# Patient Record
Sex: FEMALE | Race: BLACK OR AFRICAN AMERICAN | Employment: UNEMPLOYED | URBAN - METROPOLITAN AREA
[De-identification: names, ages, dates, MRNs, and addresses within clinical notes are randomized per-mention and may not be internally consistent; named-entity substitution may affect disease eponyms.]

---
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
## 2024-01-24 NOTE — PROGRESS NOTES
Patient: Kamini Pak                MRN: [id number]       SSN: [id number]  YOB: 2005        AGE: 18 y.o.        SEX: female      PCP: Houston Valdez MD  01/26/24    Chief Complaint   Patient presents with    Hand Pain     Left thumb     HISTORY:  Kamini Pak is a 18 y.o. female who is seen for increased left thumb pain related to her injury in November. She heard and felt her left thumb crack while attempting to do a handstand. She was seen at [facility] ED on 12/4/23 where xrays revealed no acute injury. She was provided with a thumb spica splint. Her thumb has been aching and stiff since her injury.  She is doing better but she still experiences pain when she bends her thumb.  She notes she is double jointed and her thumb now has greatly diminished flexibility. She is right-handed. She experiences pain at night.     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 She is originally from [place]. She currently lives in the freshmen dorms at [employer]. 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    Wt Readings from Last 3 Encounters:   01/26/24 63 kg (139 lb) (70 %, Z= 0.52)*   12/11/23 65.8 kg (145 lb) (77 %, Z= 0.75)*   12/04/23 60.7 kg (133 lb 14.4 oz) (63 %, Z= 0.34)*     * Growth percentiles are based on [facility] (Girls, 2-20 Years) data.      Body mass index is 25.42 kg/m².    Patient Active Problem List   Diagnosis    No known health problems    S/P bilateral breast reduction       Social History     Tobacco Use    Smoking status: Never     Passive exposure: Yes    Smokeless tobacco: Never   Substance Use Topics    Alcohol use: Never    Drug use: Never        Allergies   Allergen Reactions    Copper-Containing Compounds Rash        Current Outpatient Medications   Medication Sig    ondansetron (ZOFRAN) 4 MG tablet Take 1 tablet by mouth every 8 hours as needed for Nausea or Vomiting (Patient not taking: Reported on 12/4/2023)

## 2024-01-26 ENCOUNTER — OFFICE VISIT (OUTPATIENT)
Age: 19
End: 2024-01-26
Payer: MEDICAID

## 2024-01-26 VITALS — WEIGHT: 139 LBS | TEMPERATURE: 97.3 F | BODY MASS INDEX: 25.58 KG/M2 | HEIGHT: 62 IN

## 2024-01-26 DIAGNOSIS — G89.29 CHRONIC THUMB PAIN, LEFT: ICD-10-CM

## 2024-01-26 DIAGNOSIS — S63.642D GAMEKEEPER'S THUMB OF LEFT HAND, SUBSEQUENT ENCOUNTER: Primary | ICD-10-CM

## 2024-01-26 DIAGNOSIS — M79.645 CHRONIC THUMB PAIN, LEFT: ICD-10-CM

## 2024-01-26 PROCEDURE — 99213 OFFICE O/P EST LOW 20 MIN: CPT | Performed by: SPECIALIST

## 2024-02-23 ENCOUNTER — OFFICE VISIT (OUTPATIENT)
Age: 19
End: 2024-02-23
Payer: MEDICAID

## 2024-02-23 VITALS — WEIGHT: 140 LBS | BODY MASS INDEX: 24.8 KG/M2 | HEIGHT: 63 IN

## 2024-02-23 DIAGNOSIS — S63.642A SPRAIN OF METACARPOPHALANGEAL (MCP) JOINT OF LEFT THUMB, INITIAL ENCOUNTER: Primary | ICD-10-CM

## 2024-02-23 PROCEDURE — 99213 OFFICE O/P EST LOW 20 MIN: CPT | Performed by: ORTHOPAEDIC SURGERY

## 2024-02-23 NOTE — PROGRESS NOTES
Kamini Pak is a 18 y.o. female right handed.  Worker's Compensation and legal considerations: none    Chief Complaint   Patient presents with    Thumb pain     Left     Pain Score:   8    HPI: Patient presents today with complaints of left thumb pain since a fall in November 2023.    Date of onset: November 2023  Injury: Yes: Comment: Fall  Prior Treatment:  No    ROS: Review of Systems - General ROS: negative except HPI    Past Medical History:   Diagnosis Date    No known health problems 4/25/2019       Past Surgical History:   Procedure Laterality Date    ADENOIDECTOMY  2014    BREAST REDUCTION SURGERY      07/2021    TONSILLECTOMY  2014        Current Outpatient Medications   Medication Sig Dispense Refill    ibuprofen (ADVIL;MOTRIN) 600 MG tablet Take 1 tablet by mouth every 6 hours as needed      ondansetron (ZOFRAN) 4 MG tablet Take 1 tablet by mouth every 8 hours as needed for Nausea or Vomiting (Patient not taking: Reported on 12/4/2023) 15 tablet 0    dicyclomine (BENTYL) 10 MG capsule Take 1 capsule by mouth 4 times daily for 5 days 20 capsule 0     No current facility-administered medications for this visit.       Allergies   Allergen Reactions    Copper-Containing Compounds Rash         Ht 1.6 m (5' 3\")   Wt 63.5 kg (140 lb)   LMP 01/22/2024 (Approximate)   BMI 24.80 kg/m²   Physical Exam  Vitals and nursing note reviewed.   Constitutional:       General: She is not in acute distress.     Appearance: Normal appearance. She is not ill-appearing.   Cardiovascular:      Pulses: Normal pulses.   Pulmonary:      Effort: Pulmonary effort is normal. No respiratory distress.   Musculoskeletal:         General: Tenderness present. No swelling, deformity or signs of injury. Normal range of motion.      Cervical back: Normal range of motion and neck supple.      Right lower leg: No edema.      Left lower leg: No edema.   Skin:     General: Skin is warm and dry.      Capillary Refill: Capillary refill takes

## 2024-03-06 ENCOUNTER — HOSPITAL ENCOUNTER (EMERGENCY)
Facility: HOSPITAL | Age: 19
Discharge: HOME | End: 2024-03-06
Payer: MEDICAID

## 2024-03-06 VITALS
WEIGHT: 140 LBS | DIASTOLIC BLOOD PRESSURE: 55 MMHG | RESPIRATION RATE: 18 BRPM | HEIGHT: 62 IN | HEART RATE: 98 BPM | BODY MASS INDEX: 25.76 KG/M2 | SYSTOLIC BLOOD PRESSURE: 107 MMHG | OXYGEN SATURATION: 98 % | TEMPERATURE: 97.5 F

## 2024-03-06 DIAGNOSIS — B37.31 VAGINAL YEAST INFECTION: Primary | ICD-10-CM

## 2024-03-06 LAB
APPEARANCE UR: CLEAR
BACTERIA URNS QL MICRO: NEGATIVE /HPF
BILIRUB UR QL: NEGATIVE
C TRACH DNA SPEC QL NAA+PROBE: NEGATIVE
CLUE CELLS VAG QL WET PREP: NORMAL
COLOR UR: ABNORMAL
EPITH CASTS URNS QL MICRO: ABNORMAL /LPF
GLUCOSE UR STRIP.AUTO-MCNC: NEGATIVE MG/DL
HCG UR QL: NEGATIVE
HGB UR QL STRIP: NEGATIVE
KETONES UR QL STRIP.AUTO: NEGATIVE MG/DL
KOH PREP SPEC: NORMAL
LEUKOCYTE ESTERASE UR QL STRIP.AUTO: ABNORMAL
N GONORRHOEA DNA SPEC QL NAA+PROBE: NEGATIVE
NITRITE UR QL STRIP.AUTO: NEGATIVE
PH UR STRIP: 7 (ref 5–8)
PROT UR STRIP-MCNC: NEGATIVE MG/DL
RBC #/AREA URNS HPF: ABNORMAL /HPF (ref 0–5)
SAMPLE TYPE: NORMAL
SERVICE CMNT-IMP: NORMAL
SERVICE CMNT-IMP: NORMAL
SP GR UR REFRACTOMETRY: 1.01
SPECIMEN SOURCE: NORMAL
T VAGINALIS VAG QL WET PREP: NORMAL
URINE CULTURE IF INDICATED: ABNORMAL
UROBILINOGEN UR QL STRIP.AUTO: 0.2 EU/DL (ref 0.2–1)
WBC URNS QL MICRO: ABNORMAL /HPF (ref 0–4)

## 2024-03-06 PROCEDURE — 87491 CHLMYD TRACH DNA AMP PROBE: CPT

## 2024-03-06 PROCEDURE — 87210 SMEAR WET MOUNT SALINE/INK: CPT

## 2024-03-06 PROCEDURE — 81001 URINALYSIS AUTO W/SCOPE: CPT

## 2024-03-06 PROCEDURE — 81025 URINE PREGNANCY TEST: CPT

## 2024-03-06 PROCEDURE — 99283 EMERGENCY DEPT VISIT LOW MDM: CPT

## 2024-03-06 PROCEDURE — 6370000000 HC RX 637 (ALT 250 FOR IP): Performed by: PHYSICIAN ASSISTANT

## 2024-03-06 PROCEDURE — 87591 N.GONORRHOEAE DNA AMP PROB: CPT

## 2024-03-06 RX ORDER — NYSTATIN AND TRIAMCINOLONE ACETONIDE 100000; 1 [USP'U]/G; MG/G
OINTMENT TOPICAL
Qty: 15 G | Refills: 0 | Status: SHIPPED | OUTPATIENT
Start: 2024-03-06

## 2024-03-06 RX ORDER — FLUCONAZOLE 150 MG/1
150 TABLET ORAL ONCE
Qty: 1 TABLET | Refills: 0 | Status: SHIPPED | OUTPATIENT
Start: 2024-03-09 | End: 2024-03-09

## 2024-03-06 RX ORDER — FLUCONAZOLE 150 MG/1
150 TABLET ORAL
Status: COMPLETED | OUTPATIENT
Start: 2024-03-06 | End: 2024-03-06

## 2024-03-06 RX ADMIN — FLUCONAZOLE 150 MG: 150 TABLET ORAL at 16:55

## 2024-03-06 ASSESSMENT — PAIN DESCRIPTION - ORIENTATION: ORIENTATION: OTHER (COMMENT)

## 2024-03-06 ASSESSMENT — PAIN DESCRIPTION - LOCATION: LOCATION: VAGINA

## 2024-03-06 ASSESSMENT — PAIN SCALES - GENERAL: PAINLEVEL_OUTOF10: 5

## 2024-03-06 ASSESSMENT — PAIN - FUNCTIONAL ASSESSMENT: PAIN_FUNCTIONAL_ASSESSMENT: 0-10

## 2024-03-06 ASSESSMENT — PAIN DESCRIPTION - PAIN TYPE: TYPE: ACUTE PAIN

## 2024-03-06 ASSESSMENT — PAIN DESCRIPTION - DESCRIPTORS: DESCRIPTORS: SHARP

## 2024-03-06 NOTE — ED NOTES

## 2024-03-06 NOTE — ED NOTES
Pt presents ambulatory to ED complaining of white, thick vaginal discharge, vaginal itching, swelling, and redness for the past 2-3 days. Pt reports she just finished antibiotics for BV. Pt also reports that partner was treated for chlamydia recently. Pt is alert and oriented x 4, RR even and unlabored, skin is warm and dry. Assesment completed and pt updated on plan of care.       Emergency Department Nursing Plan of Care       The Nursing Plan of Care is developed from the Nursing assessment and Emergency Department Attending provider initial evaluation.  The plan of care may be reviewed in the “ED Provider note”.      Signed     MADY PATEL RN    3/6/2024   5:13 PM

## 2024-03-06 NOTE — ED PROVIDER NOTES
medications      dicyclomine 10 MG capsule  Commonly known as: BENTYL  Take 1 capsule by mouth 4 times daily for 5 days     ibuprofen 600 MG tablet  Commonly known as: ADVIL;MOTRIN     ondansetron 4 MG tablet  Commonly known as: ZOFRAN  Take 1 tablet by mouth every 8 hours as needed for Nausea or Vomiting               Where to Get Your Medications        These medications were sent to [facility]/pharmacy #[id number] - [place], [place] - [address] - P [phone] - F [phone]  [address] [facility] [address]      Hours: 24-hours Phone: [phone]   fluconazole 150 MG tablet  nystatin-triamcinolone 762419-5.1 UNIT/GM-% ointment           DISCONTINUED MEDICATIONS:  Current Discharge Medication List          I have seen and evaluated the patient autonomously. My supervision physician was on site and available for consultation if needed.     I am the Primary Clinician of Record.   Zaira Sanchez PA-C (electronically signed)    (Please note that parts of this dictation were completed with voice recognition software. Quite often unanticipated grammatical, syntax, homophones, and other interpretive errors are inadvertently transcribed by the computer software. Please disregards these errors. Please excuse any errors that have escaped final proofreading.)     Zaira Sanchez PA-C  03/06/24 [id number]

## 2024-03-07 RX ORDER — NYSTATIN 100000 U/G
CREAM TOPICAL
Qty: 15 G | Refills: 0 | Status: SHIPPED | OUTPATIENT
Start: 2024-03-07

## 2024-03-14 ENCOUNTER — HOSPITAL ENCOUNTER (OUTPATIENT)
Facility: HOSPITAL | Age: 19
Discharge: HOME | End: 2024-03-14
Attending: ORTHOPAEDIC SURGERY
Payer: MEDICAID

## 2024-03-14 DIAGNOSIS — S63.642A SPRAIN OF METACARPOPHALANGEAL (MCP) JOINT OF LEFT THUMB, INITIAL ENCOUNTER: ICD-10-CM

## 2024-03-14 PROCEDURE — 73218 MRI UPPER EXTREMITY W/O DYE: CPT

## 2024-05-15 ENCOUNTER — NURSE TRIAGE (OUTPATIENT)
Dept: OTHER | Facility: CLINIC | Age: 19
End: 2024-05-15

## 2024-05-21 ENCOUNTER — TELEPHONE (OUTPATIENT)
Age: 19
End: 2024-05-21

## 2024-05-21 NOTE — TELEPHONE ENCOUNTER
Patient decline a new patient appointment in August because she will going to college in [place] and will establish a PCP in [facility]

## 2024-07-13 ENCOUNTER — HOSPITAL ENCOUNTER (EMERGENCY)
Facility: HOSPITAL | Age: 19
Discharge: HOME | End: 2024-07-13
Payer: MEDICAID

## 2024-07-13 VITALS
WEIGHT: 137.5 LBS | HEIGHT: 63 IN | OXYGEN SATURATION: 99 % | DIASTOLIC BLOOD PRESSURE: 69 MMHG | RESPIRATION RATE: 16 BRPM | HEART RATE: 86 BPM | TEMPERATURE: 98.5 F | BODY MASS INDEX: 24.36 KG/M2 | SYSTOLIC BLOOD PRESSURE: 105 MMHG

## 2024-07-13 DIAGNOSIS — Z98.818 STATUS POST WISDOM TOOTH EXTRACTION: ICD-10-CM

## 2024-07-13 DIAGNOSIS — K08.89 DENTALGIA: Primary | ICD-10-CM

## 2024-07-13 PROCEDURE — 6370000000 HC RX 637 (ALT 250 FOR IP): Performed by: PHYSICIAN ASSISTANT

## 2024-07-13 PROCEDURE — 99283 EMERGENCY DEPT VISIT LOW MDM: CPT

## 2024-07-13 RX ORDER — IBUPROFEN 800 MG/1
800 TABLET ORAL EVERY 8 HOURS PRN
Qty: 20 TABLET | Refills: 0 | Status: SHIPPED | OUTPATIENT
Start: 2024-07-13

## 2024-07-13 RX ORDER — ACETAMINOPHEN 500 MG
1000 TABLET ORAL
Status: COMPLETED | OUTPATIENT
Start: 2024-07-13 | End: 2024-07-13

## 2024-07-13 RX ORDER — ACETAMINOPHEN 500 MG
1000 TABLET ORAL EVERY 6 HOURS PRN
Qty: 20 TABLET | Refills: 0 | Status: SHIPPED | OUTPATIENT
Start: 2024-07-13

## 2024-07-13 RX ADMIN — ACETAMINOPHEN 1000 MG: 500 TABLET ORAL at 17:39

## 2024-07-13 RX ADMIN — BENZOCAINE, BUTAMBEN, AND TETRACAINE HYDROCHLORIDE: .028; .004; .004 AEROSOL, SPRAY TOPICAL at 17:37

## 2024-07-13 ASSESSMENT — ENCOUNTER SYMPTOMS
RHINORRHEA: 0
WHEEZING: 0
FACIAL SWELLING: 1
DIARRHEA: 0
VOICE CHANGE: 0
SHORTNESS OF BREATH: 0
ABDOMINAL PAIN: 0
SINUS PRESSURE: 0
COUGH: 0
NAUSEA: 0
EYE PAIN: 0
VOMITING: 0
SORE THROAT: 0
BACK PAIN: 0
TROUBLE SWALLOWING: 0
SINUS PAIN: 0
CHEST TIGHTNESS: 0

## 2024-07-13 ASSESSMENT — PAIN DESCRIPTION - ORIENTATION: ORIENTATION: LEFT;RIGHT

## 2024-07-13 ASSESSMENT — PAIN - FUNCTIONAL ASSESSMENT: PAIN_FUNCTIONAL_ASSESSMENT: NONE - DENIES PAIN

## 2024-07-13 ASSESSMENT — PAIN DESCRIPTION - LOCATION: LOCATION: MOUTH;JAW

## 2024-07-13 ASSESSMENT — PAIN SCALES - GENERAL: PAINLEVEL_OUTOF10: 6

## 2024-07-13 NOTE — ED NOTES
Pt presents ambulatory to ED complaining of dental pain from both sides of mouth due to wisdom tooth extraction on Wednesday. Pt is alert and oriented x 4, RR even and unlabored, skin is warm and dry. Assesment completed and pt updated on plan of care.       Emergency Department Nursing Plan of Care       The Nursing Plan of Care is developed from the Nursing assessment and Emergency Department Attending provider initial evaluation.  The plan of care may be reviewed in the “ED Provider note”.    The Plan of Care was developed with the following considerations:   Patient / Family readiness to learn indicated by:verbalized understanding  Persons(s) to be included in education: patient  Barriers to Learning/Limitations:None    Signed     Kaley Bardales RN    7/13/2024   5:48 PM

## 2024-07-13 NOTE — ED TRIAGE NOTES
Pt presents ambulatory to ED complaining of mouth and jaw pain and swelling after wisdom teeth surgery on Wednesday. Pt concerned for post-op infection or dry socket. Pt reports gum swelling. Pt denies calling oral surgeon for post-op care.

## 2024-07-13 NOTE — ED PROVIDER NOTES
stiffness.   Skin:  Negative for rash and wound.   Neurological:  Negative for dizziness, seizures, syncope, weakness, light-headedness and headaches.   Psychiatric/Behavioral:  Negative for confusion.         Positives and Pertinent negatives as per HPI.    PAST HISTORY     Past Medical History:  Past Medical History:   Diagnosis Date    No known health problems 4/25/2019       Past Surgical History:  Past Surgical History:   Procedure Laterality Date    ADENOIDECTOMY  2014    BREAST REDUCTION SURGERY      07/2021    TONSILLECTOMY  2014       Family History:  Family History   Problem Relation Age of Onset    Diabetes Maternal Grandfather     Hypertension Maternal Grandfather     Diabetes Maternal Grandmother     Hypertension Maternal Grandmother     Allergic Rhinitis Sister     Diabetes Father     Sleep Apnea Mother        Social History:  Social History     Tobacco Use    Smoking status: Never     Passive exposure: Yes    Smokeless tobacco: Never   Vaping Use    Vaping Use: Never used   Substance Use Topics    Alcohol use: Never    Drug use: Yes     Types: Marijuana (Weed)       Allergies:  Allergies   Allergen Reactions    Copper-Containing Compounds Rash       CURRENT MEDICATIONS      Discharge Medication List as of 7/13/2024  5:28 PM        CONTINUE these medications which have NOT CHANGED    Details   Multiple Vitamins-Minerals (HAIR SKIN & NAILS PO) Take by mouthHistorical Med             SCREENINGS               No data recorded         PHYSICAL EXAM      Vitals:    07/13/24 1710 07/13/24 1807   BP: 105/69    Pulse: (!) 105 86   Resp: 16    Temp: 98.5 °F (36.9 °C)    TempSrc: Oral    SpO2: 99%    Weight: 62.4 kg (137 lb 8 oz)    Height: 1.6 m (5' 3\")         Physical Exam  Vitals and nursing note reviewed.   Constitutional:       General: She is not in acute distress.     Appearance: Normal appearance. She is not ill-appearing, toxic-appearing or diaphoretic.   HENT:      Head: Normocephalic and atraumatic.          PROCEDURES   Unless otherwise noted below, none  Procedures     CRITICAL CARE TIME   none    EMERGENCY DEPARTMENT COURSE and DIFFERENTIAL DIAGNOSIS/MDM   Initial assessment performed. The patients presenting problems have been discussed, and they are in agreement with the care plan formulated and outlined with them.  I have encouraged them to ask questions as they arise throughout their visit.    Vitals:    Vitals:    07/13/24 1710 07/13/24 1807   BP: 105/69    Pulse: (!) 105 86   Resp: 16    Temp: 98.5 °F (36.9 °C)    TempSrc: Oral    SpO2: 99%    Weight: 62.4 kg (137 lb 8 oz)    Height: 1.6 m (5' 3\")         Patient was given the following medications:  Medications   dental ball (lidocaine/benadryl/cetacaine) mixture ( Mucous membrane Given 7/13/24 1737)   acetaminophen (TYLENOL) tablet 1,000 mg (1,000 mg Oral Given 7/13/24 1739)       CONSULTS: (Who and What was discussed)  None      Chronic Conditions: none    Social Determinants affecting Dx or Tx: None    Records Reviewed (source and summary): Nursing Notes, Old Medical Records, Previous Radiology Studies, and Previous Laboratory Studies    CC/HPI Summary, DDx, ED Course, and Reassessment: Overall well-appearing afebrile and hemodynamically stable 18-year-old female present with acute moderate aching bilateral mouth pain and swelling X 1 day.  Patient endorses that she had bilateral wrist tooth extraction on Wednesday and was doing well until she sipped out of straw today and had worsening pain with slight oozing blood.  She has not yet called the oral surgeon for postop care.  No fever, chills, nausea, vomiting, shortness of breath, lightheadedness, dizziness.  Ibuprofen prior to arrival with mild relief. on exam, patient has mild swelling to bilateral cheeks/jaw with mild trismus d/t pain.  No drooling, tripoding, muffled voice, stridor.  She has mild swelling to bilateral posterior gum lines with scant oozing blood from socket of #17.  No other

## 2025-05-14 ENCOUNTER — HOSPITAL ENCOUNTER (EMERGENCY)
Facility: HOSPITAL | Age: 20
Discharge: HOME | End: 2025-05-14

## 2025-05-14 VITALS
DIASTOLIC BLOOD PRESSURE: 96 MMHG | RESPIRATION RATE: 18 BRPM | TEMPERATURE: 98.2 F | BODY MASS INDEX: 26.25 KG/M2 | HEART RATE: 90 BPM | OXYGEN SATURATION: 97 % | SYSTOLIC BLOOD PRESSURE: 118 MMHG | WEIGHT: 142.64 LBS | HEIGHT: 62 IN

## 2025-05-14 DIAGNOSIS — H93.13 TINNITUS OF BOTH EARS: ICD-10-CM

## 2025-05-14 DIAGNOSIS — H65.193 ACUTE MEE (MIDDLE EAR EFFUSION), BILATERAL: ICD-10-CM

## 2025-05-14 DIAGNOSIS — R51.9 SINUS HEADACHE: ICD-10-CM

## 2025-05-14 DIAGNOSIS — R09.81 NASAL SINUS CONGESTION: Primary | ICD-10-CM

## 2025-05-14 LAB
FLUAV RNA SPEC QL NAA+PROBE: NOT DETECTED
FLUBV RNA SPEC QL NAA+PROBE: NOT DETECTED
S PYO DNA THROAT QL NAA+PROBE: NOT DETECTED
SARS-COV-2 RNA RESP QL NAA+PROBE: NOT DETECTED
SOURCE: NORMAL

## 2025-05-14 PROCEDURE — 6360000002 HC RX W HCPCS

## 2025-05-14 PROCEDURE — 99283 EMERGENCY DEPT VISIT LOW MDM: CPT

## 2025-05-14 PROCEDURE — 87636 SARSCOV2 & INF A&B AMP PRB: CPT

## 2025-05-14 PROCEDURE — 87651 STREP A DNA AMP PROBE: CPT

## 2025-05-14 PROCEDURE — 6370000000 HC RX 637 (ALT 250 FOR IP)

## 2025-05-14 RX ORDER — CETIRIZINE HYDROCHLORIDE 10 MG/1
10 TABLET ORAL DAILY
Qty: 30 TABLET | Refills: 0 | Status: SHIPPED | OUTPATIENT
Start: 2025-05-14

## 2025-05-14 RX ORDER — IBUPROFEN 800 MG/1
800 TABLET, FILM COATED ORAL EVERY 8 HOURS PRN
Qty: 30 TABLET | Refills: 0 | Status: SHIPPED | OUTPATIENT
Start: 2025-05-14

## 2025-05-14 RX ORDER — ACETAMINOPHEN 500 MG
1000 TABLET ORAL
Status: COMPLETED | OUTPATIENT
Start: 2025-05-14 | End: 2025-05-14

## 2025-05-14 RX ORDER — PREDNISONE 20 MG/1
20 TABLET ORAL DAILY
Qty: 5 TABLET | Refills: 0 | Status: SHIPPED | OUTPATIENT
Start: 2025-05-15 | End: 2025-05-20

## 2025-05-14 RX ORDER — DEXAMETHASONE 4 MG/1
8 TABLET ORAL ONCE
Status: COMPLETED | OUTPATIENT
Start: 2025-05-14 | End: 2025-05-14

## 2025-05-14 RX ORDER — PSEUDOEPHEDRINE HCL 30 MG/1
30 TABLET, FILM COATED ORAL EVERY 4 HOURS PRN
Status: DISCONTINUED | OUTPATIENT
Start: 2025-05-14 | End: 2025-05-14 | Stop reason: HOSPADM

## 2025-05-14 RX ORDER — ACETAMINOPHEN 500 MG
1000 TABLET ORAL EVERY 6 HOURS PRN
Qty: 30 TABLET | Refills: 0 | Status: SHIPPED | OUTPATIENT
Start: 2025-05-14

## 2025-05-14 RX ORDER — AZELASTINE 1 MG/ML
2 SPRAY, METERED NASAL 2 TIMES DAILY
Qty: 120 ML | Refills: 1 | Status: SHIPPED | OUTPATIENT
Start: 2025-05-14

## 2025-05-14 RX ADMIN — DEXAMETHASONE 8 MG: 4 TABLET ORAL at 14:49

## 2025-05-14 RX ADMIN — ACETAMINOPHEN 1000 MG: 500 TABLET ORAL at 14:49

## 2025-05-14 RX ADMIN — PSEUDOEPHEDRINE HCL 30 MG: 30 TABLET, FILM COATED ORAL at 14:49

## 2025-05-14 ASSESSMENT — PAIN SCALES - GENERAL: PAINLEVEL_OUTOF10: 8

## 2025-05-14 ASSESSMENT — PAIN DESCRIPTION - LOCATION: LOCATION: HEAD

## 2025-05-14 NOTE — ED NOTES
Discharge instructions were given to the patient by Corie Torres RN.  The patient left the Emergency Department alert and oriented and in no acute distress with 3 prescription(s). The patient was encouraged to call or return to the ED for worsening issues or problems and was encouraged to schedule a follow up appointment for continuing care.  The patient verbalized understanding of discharge instructions and prescriptions; all questions were answered. The patient has no further concerns at this time.

## 2025-05-14 NOTE — ED PROVIDER NOTES
[facility] EMERGENCY DEPARTMENT  EMERGENCY DEPARTMENT ENCOUNTER       Pt Name: Kamini Pak  MRN: [id number]  Birthdate 2005  Date of evaluation: 5/14/2025  Provider: MADHURI Galdamez CNP   PCP: None, None  Note Started:  2:31 PM EDT 5/14/25     CHIEF COMPLAINT       Chief Complaint   Patient presents with    Cold Symptoms        HISTORY OF PRESENT ILLNESS: 1 or more elements      History From: Patient  HPI Limitations: None     Kamini Pak is a 19 y.o. female who presents complaining of runny nose, sneezing, headache, and a ringing sound to the ears, x 1 day.  Denies injury or recent travel.  Denies blurry vision, headache, nausea, vomiting or diarrhea.  Denies myalgia.     Nursing Notes were all reviewed and agreed with or any disagreements were addressed in the HPI.     REVIEW OF SYSTEMS      Review of Systems     Positives and Pertinent negatives as per HPI.    PAST HISTORY     Past Medical History:  Past Medical History:   Diagnosis Date    No known health problems 4/25/2019       Past Surgical History:  Past Surgical History:   Procedure Laterality Date    ADENOIDECTOMY  2014    BREAST REDUCTION SURGERY      07/2021    TONSILLECTOMY  2014       Family History:  Family History   Problem Relation Age of Onset    Diabetes Maternal Grandfather     Hypertension Maternal Grandfather     Diabetes Maternal Grandmother     Hypertension Maternal Grandmother     Allergic Rhinitis Sister     Diabetes Father     Sleep Apnea Mother        Social History:  Social History     Tobacco Use    Smoking status: Never     Passive exposure: Yes    Smokeless tobacco: Never   Vaping Use    Vaping status: Never Used   Substance Use Topics    Alcohol use: Never    Drug use: Yes     Types: Marijuana (Weed)       Allergies:  Allergies   Allergen Reactions    Copper-Containing Compounds Rash       CURRENT MEDICATIONS      Previous Medications    MULTIPLE VITAMINS-MINERALS (HAIR SKIN & NAILS PO)    Take by mouth

## 2025-05-14 NOTE — ED TRIAGE NOTES
Pt came in the ED with complaints of runny nose, frequent sneezing and sore throat started this morning. Pt also reports headache, pt reports whooshing sensation on her head. Pt denies fever and cough.

## 2025-05-14 NOTE — DISCHARGE INSTRUCTIONS
side effects or interactions with other medications, please call your primary care provider or contact us directly.  Again, THANK YOU for choosing us to care for YOU!

## 2025-05-14 NOTE — ED NOTES
See triage note. Pt is alert and oriented x 4, RR even and unlabored, skin is warm and dry. Assesment completed and pt updated on plan of care.       Emergency Department Nursing Plan of Care       The Nursing Plan of Care is developed from the Nursing assessment and Emergency Department Attending provider initial evaluation.  The plan of care may be reviewed in the “ED Provider note”.    The Plan of Care was developed with the following considerations:   Patient / Family readiness to learn indicated by:verbalized understanding  Persons(s) to be included in education: patient  Barriers to Learning/Limitations:None    Signed     Corie Torres RN    5/14/2025   2:57 PM